# Patient Record
Sex: MALE | Race: BLACK OR AFRICAN AMERICAN | NOT HISPANIC OR LATINO | URBAN - METROPOLITAN AREA
[De-identification: names, ages, dates, MRNs, and addresses within clinical notes are randomized per-mention and may not be internally consistent; named-entity substitution may affect disease eponyms.]

---

## 2022-01-18 ENCOUNTER — EMERGENCY (EMERGENCY)
Facility: HOSPITAL | Age: 67
LOS: 1 days | Discharge: HOME | End: 2022-01-18
Attending: EMERGENCY MEDICINE | Admitting: EMERGENCY MEDICINE
Payer: MEDICAID

## 2022-01-18 VITALS
HEART RATE: 114 BPM | HEIGHT: 67 IN | SYSTOLIC BLOOD PRESSURE: 111 MMHG | OXYGEN SATURATION: 98 % | DIASTOLIC BLOOD PRESSURE: 73 MMHG | RESPIRATION RATE: 18 BRPM | WEIGHT: 130.07 LBS | TEMPERATURE: 97 F

## 2022-01-18 DIAGNOSIS — U07.1 COVID-19: ICD-10-CM

## 2022-01-18 DIAGNOSIS — R06.02 SHORTNESS OF BREATH: ICD-10-CM

## 2022-01-18 DIAGNOSIS — R00.0 TACHYCARDIA, UNSPECIFIED: ICD-10-CM

## 2022-01-18 LAB
ALBUMIN SERPL ELPH-MCNC: 2.5 G/DL — LOW (ref 3.4–5)
ALP SERPL-CCNC: 104 U/L — SIGNIFICANT CHANGE UP (ref 40–120)
ALT FLD-CCNC: 24 U/L — SIGNIFICANT CHANGE UP (ref 12–42)
ANION GAP SERPL CALC-SCNC: 7 MMOL/L — LOW (ref 9–16)
APTT BLD: 32.1 SEC — SIGNIFICANT CHANGE UP (ref 27.5–35.5)
AST SERPL-CCNC: 32 U/L — SIGNIFICANT CHANGE UP (ref 15–37)
BASOPHILS # BLD AUTO: 0.03 K/UL — SIGNIFICANT CHANGE UP (ref 0–0.2)
BASOPHILS NFR BLD AUTO: 0.7 % — SIGNIFICANT CHANGE UP (ref 0–2)
BILIRUB SERPL-MCNC: 0.2 MG/DL — SIGNIFICANT CHANGE UP (ref 0.2–1.2)
BUN SERPL-MCNC: 24 MG/DL — HIGH (ref 7–23)
CALCIUM SERPL-MCNC: 9.2 MG/DL — SIGNIFICANT CHANGE UP (ref 8.5–10.5)
CHLORIDE SERPL-SCNC: 103 MMOL/L — SIGNIFICANT CHANGE UP (ref 96–108)
CO2 SERPL-SCNC: 28 MMOL/L — SIGNIFICANT CHANGE UP (ref 22–31)
CREAT SERPL-MCNC: 0.97 MG/DL — SIGNIFICANT CHANGE UP (ref 0.5–1.3)
CRP SERPL-MCNC: 59 MG/L — HIGH (ref 0–9)
D DIMER BLD IA.RAPID-MCNC: 394 NG/ML DDU — HIGH
EOSINOPHIL # BLD AUTO: 0.03 K/UL — SIGNIFICANT CHANGE UP (ref 0–0.5)
EOSINOPHIL NFR BLD AUTO: 0.7 % — SIGNIFICANT CHANGE UP (ref 0–6)
GLUCOSE SERPL-MCNC: 105 MG/DL — HIGH (ref 70–99)
HCT VFR BLD CALC: 36.7 % — LOW (ref 39–50)
HGB BLD-MCNC: 11.4 G/DL — LOW (ref 13–17)
IMM GRANULOCYTES NFR BLD AUTO: 2.4 % — HIGH (ref 0–1.5)
INR BLD: 1.03 — SIGNIFICANT CHANGE UP (ref 0.88–1.16)
LG PLATELETS BLD QL AUTO: SIGNIFICANT CHANGE UP
LYMPHOCYTES # BLD AUTO: 0.92 K/UL — LOW (ref 1–3.3)
LYMPHOCYTES # BLD AUTO: 22.1 % — SIGNIFICANT CHANGE UP (ref 13–44)
MANUAL SMEAR VERIFICATION: SIGNIFICANT CHANGE UP
MCHC RBC-ENTMCNC: 29.2 PG — SIGNIFICANT CHANGE UP (ref 27–34)
MCHC RBC-ENTMCNC: 31.1 GM/DL — LOW (ref 32–36)
MCV RBC AUTO: 93.9 FL — SIGNIFICANT CHANGE UP (ref 80–100)
MONOCYTES # BLD AUTO: 0.48 K/UL — SIGNIFICANT CHANGE UP (ref 0–0.9)
MONOCYTES NFR BLD AUTO: 11.5 % — SIGNIFICANT CHANGE UP (ref 2–14)
NEUTROPHILS # BLD AUTO: 2.6 K/UL — SIGNIFICANT CHANGE UP (ref 1.8–7.4)
NEUTROPHILS NFR BLD AUTO: 62.6 % — SIGNIFICANT CHANGE UP (ref 43–77)
NRBC # BLD: 0 /100 WBCS — SIGNIFICANT CHANGE UP (ref 0–0)
NT-PROBNP SERPL-SCNC: 238 PG/ML — SIGNIFICANT CHANGE UP
PLAT MORPH BLD: ABNORMAL
PLATELET # BLD AUTO: 186 K/UL — SIGNIFICANT CHANGE UP (ref 150–400)
PLATELET CLUMP BLD QL SMEAR: ABNORMAL
POTASSIUM SERPL-MCNC: 4.6 MMOL/L — SIGNIFICANT CHANGE UP (ref 3.5–5.3)
POTASSIUM SERPL-SCNC: 4.6 MMOL/L — SIGNIFICANT CHANGE UP (ref 3.5–5.3)
PROT SERPL-MCNC: 8 G/DL — SIGNIFICANT CHANGE UP (ref 6.4–8.2)
PROTHROM AB SERPL-ACNC: 12.3 SEC — SIGNIFICANT CHANGE UP (ref 10.6–13.6)
RBC # BLD: 3.91 M/UL — LOW (ref 4.2–5.8)
RBC # FLD: 15.4 % — HIGH (ref 10.3–14.5)
RBC BLD AUTO: NORMAL — SIGNIFICANT CHANGE UP
SARS-COV-2 RNA SPEC QL NAA+PROBE: DETECTED
SODIUM SERPL-SCNC: 138 MMOL/L — SIGNIFICANT CHANGE UP (ref 132–145)
TROPONIN I, HIGH SENSITIVITY RESULT: 6.6 NG/L — SIGNIFICANT CHANGE UP
WBC # BLD: 4.16 K/UL — SIGNIFICANT CHANGE UP (ref 3.8–10.5)
WBC # FLD AUTO: 4.16 K/UL — SIGNIFICANT CHANGE UP (ref 3.8–10.5)

## 2022-01-18 PROCEDURE — 93010 ELECTROCARDIOGRAM REPORT: CPT

## 2022-01-18 PROCEDURE — 99220: CPT

## 2022-01-18 PROCEDURE — 71275 CT ANGIOGRAPHY CHEST: CPT | Mod: 26

## 2022-01-18 PROCEDURE — 71045 X-RAY EXAM CHEST 1 VIEW: CPT | Mod: 26

## 2022-01-18 RX ORDER — ACETAMINOPHEN 500 MG
650 TABLET ORAL ONCE
Refills: 0 | Status: COMPLETED | OUTPATIENT
Start: 2022-01-18 | End: 2022-01-18

## 2022-01-18 RX ORDER — SOTROVIMAB 62.5 MG/ML
500 INJECTION, SOLUTION, CONCENTRATE INTRAVENOUS ONCE
Refills: 0 | Status: COMPLETED | OUTPATIENT
Start: 2022-01-18 | End: 2022-01-18

## 2022-01-18 RX ORDER — SODIUM CHLORIDE 9 MG/ML
250 INJECTION INTRAMUSCULAR; INTRAVENOUS; SUBCUTANEOUS
Refills: 0 | Status: COMPLETED | OUTPATIENT
Start: 2022-01-18 | End: 2022-01-18

## 2022-01-18 RX ORDER — SODIUM CHLORIDE 9 MG/ML
500 INJECTION INTRAMUSCULAR; INTRAVENOUS; SUBCUTANEOUS ONCE
Refills: 0 | Status: COMPLETED | OUTPATIENT
Start: 2022-01-18 | End: 2022-01-18

## 2022-01-18 RX ADMIN — SOTROVIMAB 500 MILLIGRAM(S): 62.5 INJECTION, SOLUTION, CONCENTRATE INTRAVENOUS at 20:45

## 2022-01-18 RX ADMIN — SODIUM CHLORIDE 250 MILLILITER(S): 9 INJECTION INTRAMUSCULAR; INTRAVENOUS; SUBCUTANEOUS at 19:56

## 2022-01-18 RX ADMIN — Medication 650 MILLIGRAM(S): at 13:34

## 2022-01-18 RX ADMIN — SODIUM CHLORIDE 100 MILLILITER(S): 9 INJECTION INTRAMUSCULAR; INTRAVENOUS; SUBCUTANEOUS at 19:41

## 2022-01-18 RX ADMIN — SODIUM CHLORIDE 500 MILLILITER(S): 9 INJECTION INTRAMUSCULAR; INTRAVENOUS; SUBCUTANEOUS at 02:14

## 2022-01-18 RX ADMIN — SODIUM CHLORIDE 500 MILLILITER(S): 9 INJECTION INTRAMUSCULAR; INTRAVENOUS; SUBCUTANEOUS at 13:34

## 2022-01-18 RX ADMIN — SOTROVIMAB 116 MILLIGRAM(S): 62.5 INJECTION, SOLUTION, CONCENTRATE INTRAVENOUS at 20:33

## 2022-01-18 NOTE — ED CDU PROVIDER INITIAL DAY NOTE - OBJECTIVE STATEMENT
Tested positive for COVID 5 days ago while at shelter.  Asymptomatic at that time.  Moved to [facility].  4 days no appetite and feeling short of breath.  Tired when walks short distances.  + chills, no vomiting.  Few episodes of vomiting and diarrhea over the past few days.  No chest pain or headache.  NO co-morbidities.  Non smoker.  No alcohol use.

## 2022-01-18 NOTE — ED CDU PROVIDER INITIAL DAY NOTE - DATE/TIME 2
Health Maintenance Due   Topic Date Due   â¢ Pneumococcal 19-64 Medium Risk (1 of 1 - PPSV23) 07/13/1981   â¢ Hepatitis C Screening  07/13/2011   â¢ Diabetes Eye Exam  10/16/2018   â¢ Diabetes Urine Microalbumin  10/31/2018        Patient is due for topics as listed above but declines Immunization(s) Pneumococcal at this time. Orders placed for  Diabetes Eye Exam and Hepatitis C Screening. Urine sample done. 19-Jan-2022 04:32

## 2022-01-18 NOTE — ED PROVIDER NOTE - CARE PLAN
Principal Discharge DX:	Pneumonia due to COVID-19 virus   1 Principal Discharge DX:	2019 novel coronavirus disease (COVID-19)

## 2022-01-18 NOTE — ED PROVIDER NOTE - CLINICAL SUMMARY MEDICAL DECISION MAKING FREE TEXT BOX
Diagnosed with COVID 5 days ago presents with 4 days of weakness, lack of appetite, sob/POE, chills.  NO fever, no chest pain.  Few episodes of vomiting/diarrhea which has resolved.  Non smoker, no co-morbidities.  Not vaccinated.  Imaging, labs, tylenol, 500 cc bolus fluid.  Unable to complete 1 minute walking trial as he became weak.  HR increased to the 120s, sat stable around 96% with increased WOB RR 24-26.

## 2022-01-18 NOTE — ED ADULT NURSE REASSESSMENT NOTE - NS ED NURSE REASSESS COMMENT FT1
Pt report received from HESHAM Miranda pt pending possible admission to [facility] for covid-19 SOB. Will continue to assess and evaluate.

## 2022-01-18 NOTE — ED ADULT TRIAGE NOTE - CHIEF COMPLAINT QUOTE
Pt BIBEMS from covid + shelter complaining of sob, cough, diarrhea, and weakness. Pt diagnosed with coivd 5 days ago. Unvaccinated.

## 2022-01-18 NOTE — ED ADULT NURSE NOTE - OBJECTIVE STATEMENT
Pt presents to ED from covid +shelter c/o worsening body aches, SOB with tachypnea, cough, N/D and generalized weakness. Pt reports being dx with covid x5days ago.

## 2022-01-18 NOTE — ED PROVIDER NOTE - PROGRESS NOTE DETAILS
CTA obtained as d-dimer elevated and no bronchospasm/rhonchi heard on exam with persistent SOB and no hypoxia.  No PE but bilateral airspace disease present.  CRP elevated.  trop/bnp wnl.  Symptoms essentially unchanged.  HR improved.  BP remains stable.  Mild tachypnea persists.  Spoke with Dr. Tarango and does not meet criteria for inpatient admission.  High risk for progression of COVID - age, race, unvaccinated.  Reviewed with medical director and meets criteria for ED MAB infusion.  Sotrovimab ordered and to be infused when night team arrives and staffing can accommodate.  Patient consented and scanned into alpha.  In [facility] COVID hotel and unable to get back into hotel tonight.  SW team aware and will escalate to supervisor in the morning.

## 2022-01-18 NOTE — ED CDU PROVIDER INITIAL DAY NOTE - PROGRESS NOTE DETAILS
sleeping, resting comfortably, no resp distress sp completion of mab, pt resting comfortably HANNAH Arvizu arranged  for COVID hotel.

## 2022-01-18 NOTE — ED PROVIDER NOTE - OBJECTIVE STATEMENT
Hotel today.     Tested positive for COVID 5 days ago.  4 days no appetie and feeling short of breath.  Tired when walks short distances.  + chills, no vomiting.  Few episodes of vomiting and diarrhea over the past few days.  No chest pain or headache.  NO co-morbidities.  Non smoker.  No alcohol use. Tested positive for COVID 5 days ago while at shelter.  Asymptomatic at that time.  [facility].  4 days no appetite and feeling short of breath.  Tired when walks short distances.  + chills, no vomiting.  Few episodes of vomiting and diarrhea over the past few days.  No chest pain or headache.  NO co-morbidities.  Non smoker.  No alcohol use.

## 2022-01-18 NOTE — ED ADULT NURSE REASSESSMENT NOTE - NS ED NURSE REASSESS COMMENT FT1
Pt tolerated MAB therapy well. VSS. Pt laying comfortably in stretcher, breathing spont unlabored on ra, eating sandwich. Denies cp, cough, f/c/n/v/d. No further complaints at this time, will continue to monitor.

## 2022-01-18 NOTE — ED CDU PROVIDER INITIAL DAY NOTE - MEDICAL DECISION MAKING DETAILS
Placed on observation for serial monitoring of respiratory status.  COVID + on day 6 without admission criteria as patient is not hypoxia but with mild increase WOB and bilateral pulmonary disease on CT scan.  No evidence of PE.  HR improved. High risk for progression of disease.  Obtained consent from patient and clearance from medical director at [facility] for ED MAB infusion.  SW involved in placement back in COVID hotel through [facility].

## 2022-01-18 NOTE — ED CDU PROVIDER INITIAL DAY NOTE - DETAILS
Placed on observation for serial monitoring of respiratory status.  COVID + on day 6 without admission criteria as patient is not hypoxia but with mild increase WOB and bilateral pulmonary disease on CT scan.  No evidence of PE.  HR improved. High risk for progression of disease.  Obtained consent from patient and clearance from medical director at [facility] for ED MAB infusion.  SW involved in placement back in COVID hotel through [facility].

## 2022-01-18 NOTE — ED BEHAVIORAL HEALTH NOTE - BEHAVIORAL HEALTH NOTE
SW was consulted to the ED by Provider to assist with Covid hotel placement.  PT is a 66 year old male who was brought in from [facility] via ems for sob.  SW call [facility] at [phone] to find out if PT can return to the hotel.  SW attempted to called [facility] and speak with shelter manager/director regarding PT discharge, but was unable to reach management by phone.  SW also called [facility] program to find out if [facility] is affiliated with the program.  Rep informed SW that hot is not affiliated with the program nor is PT register with them for hotel placement.  Provider was inform that PT would have to return [facility] covid hotel placement.  SW to follow up in the morning for possible PT return to the hotel.  Team made aware and SW made available for further assistance. SW was consulted to the ED by Provider to assist with Covid hotel placement.  PT is a 66 year old male who was brought in from [facility] via ems for sob.  SW call [facility] at [phone] to find out if PT can return to the hotel.  SW attempted to called [facility] and speak with shelter manager/director regarding PT discharge, but was unable to reach management by phone.  [facility] find out if [facility] is affiliated with the program.  Rep informed SW that hot is not affiliated with the program nor is PT register with them for hotel placement.  Provider was inform that PT would have to return [facility] placement.  SW to follow up in the morning for possible PT return to the hotel.  Team made aware and SW made available for further assistance.    Update 9:45am: Sw called the [facility] at [phone] and was transferred to the shelter manager. The shelter manager did not answer, worker was transferred back to the main desk who provided this worker with the main number for the shelter manager Mr. Casillas at [phone]. Per the shelter manager the patient is permitted to return with a copy of his discharge paper work.  Patient and team informed. Transportation arranged via bill back to the facility as patients insurance policy does not allow for car service at this time. Patient will be picked up at 10:30am. Patient and team informed. Worker made available for any further assistance needed.

## 2022-01-19 VITALS
HEART RATE: 83 BPM | SYSTOLIC BLOOD PRESSURE: 125 MMHG | TEMPERATURE: 99 F | OXYGEN SATURATION: 100 % | DIASTOLIC BLOOD PRESSURE: 76 MMHG | RESPIRATION RATE: 16 BRPM

## 2022-01-19 PROCEDURE — 99217: CPT

## 2022-01-19 NOTE — ED CDU PROVIDER DISPOSITION NOTE - CLINICAL COURSE
Patient dx'd with COVID, got MAB in the ED. Did well. Not hypoxic. Stable, SW arranged  for COVID hot.

## 2022-01-19 NOTE — ED ADULT NURSE REASSESSMENT NOTE - NS ED NURSE REASSESS COMMENT FT1
Transfer of care acknowledged with bedside rounding, lab results reviewed, will continue to monitor.  Pt remains on CM and pulse ox, in nad, respirations spont/reg/unlabored on RA.  awaiting social work in AM for shelter placement

## 2022-01-19 NOTE — ED ADULT NURSE REASSESSMENT NOTE - NS ED NURSE REASSESS COMMENT FT1
Received pt from didi RN. Pt resting, denies any complaints. Pending social work placement w/ social work in AM. Received pt from didi RN. Pt resting, denies any complaints. Pending replacement in [employer] w/ social work in AM. See social work progress note.

## 2022-01-19 NOTE — ED ADULT NURSE REASSESSMENT NOTE - NS ED NURSE REASSESS COMMENT FT1
Received Pt from previous RN sitting up on stretcher awake and alert, breathing without issue on RA and NAD. Awaiting SW consult in a.m.

## 2022-01-19 NOTE — ED CDU PROVIDER DISPOSITION NOTE - PATIENT PORTAL LINK FT
You can access the FollowMyHealth Patient Portal offered by [facility] by registering at the following website: [email or web address]. By joining [employer]’s FollowMyHealth portal, you will also be able to view your health information using other applications (apps) compatible with our system.

## 2022-01-19 NOTE — ED CDU PROVIDER DISPOSITION NOTE - NSFOLLOWUPINSTRUCTIONS_ED_ALL_ED_FT
You have Coronavirus.    COVID-19 testing are currently being prioritized at [facility] for admitted patients.     Return to the ED immediately if you have shortness of breath, fever, pain, weakness, vomiting any concerns.    1. STAY HOME for 14 DAYS  2. Minimize Human contact to ONLY ESSENTIAL  3. Every time you wash your hands, sing the HAPPY BIRTHDAY Song so you know you're washing long enough.  Make sure to scrub the webspace between your fingers.  4. DRINK 1-3 Liters of fluids day x at least 5 days.  To remain hydrated. Your fatigue, lightheadedness, and body aches will decrease and your fever has a better chance of breaking if you are well hydrated.    5. For your Fever and Body aches takes Tylenol 650-100mg every 4-6h (max 4000mg/day). Try not to use ibuprofen, aspirin or naproxen (Advil, Motrin or Aleve) as these may worsen Coronavirus infection.  6. Use an inhaler for mild shortness of breath and cough  7. Take a double or triple dose of Vitamin C (0148-1212 mg) per day spread out over the day .  8. RETURN TO THE ER IMMEDIATELY IF YOU HAVE WORSENING SHORTNESS OF BREATH. SYMPTOMS USUALLY PEAK BETWEEN DAY 7-10.
